# Patient Record
Sex: MALE | Race: BLACK OR AFRICAN AMERICAN | NOT HISPANIC OR LATINO | URBAN - METROPOLITAN AREA
[De-identification: names, ages, dates, MRNs, and addresses within clinical notes are randomized per-mention and may not be internally consistent; named-entity substitution may affect disease eponyms.]

---

## 2018-11-30 ENCOUNTER — EMERGENCY (EMERGENCY)
Facility: HOSPITAL | Age: 23
LOS: 1 days | Discharge: HOME | End: 2018-11-30
Attending: EMERGENCY MEDICINE
Payer: COMMERCIAL

## 2018-11-30 VITALS
WEIGHT: 192.02 LBS | RESPIRATION RATE: 18 BRPM | DIASTOLIC BLOOD PRESSURE: 73 MMHG | TEMPERATURE: 98 F | HEIGHT: 75 IN | OXYGEN SATURATION: 100 % | SYSTOLIC BLOOD PRESSURE: 135 MMHG | HEART RATE: 65 BPM

## 2018-11-30 VITALS
HEART RATE: 52 BPM | TEMPERATURE: 98 F | RESPIRATION RATE: 18 BRPM | SYSTOLIC BLOOD PRESSURE: 131 MMHG | DIASTOLIC BLOOD PRESSURE: 59 MMHG | OXYGEN SATURATION: 100 %

## 2018-11-30 PROCEDURE — 99284 EMERGENCY DEPT VISIT MOD MDM: CPT | Mod: 25

## 2018-11-30 PROCEDURE — 99284 EMERGENCY DEPT VISIT MOD MDM: CPT

## 2018-11-30 NOTE — ED PROVIDER NOTE - PROGRESS NOTE DETAILS
Lucrecia Jordan M.D. Resident  Extensive education to patient and parents about the workup done for him at [facility], and the role of ED ruling out life-threatening diagnoses. Explained the importance of neurology follow up. Shared decision making with patient and parents regarding pursuing CBC, BMP and troponin today. Patient and parents declined repeat blood work today and was satisfied with explanations about his workup from [facility] and the reasoning for not obtaining workup today.   Pt states symptoms much improved and anxiety about possibility to heart attack and stoke much lessened. Agreed to follow up with neurology appointment set for next week. Patient vitals stable, tolerating PO, ambulating.

## 2018-11-30 NOTE — ED PROVIDER NOTE - ATTENDING CONTRIBUTION TO CARE
------------ATTENDING NOTE------------   pt w/ family c/o 6 days of constant vague mild sharp pain in L chest, started after panic attack w/ associated hyperventilation and tingling/spasms in hands/feet/mouth, since has had intermittent gradual onset mild dull frontal/sides tension type headaches, in ED w/ normal neuro exam, clear chest, equal distal pulses, soft benign abd, tolerating PO, NSR and VS on cardiac monitor ED course, has had "normal" varying cardiopulmonary workups for same, in depth dw all about ddx, tx, richard, continued close outpt fu.  - Mack Calderon MD   ---------------------------------------------

## 2018-11-30 NOTE — ED ADULT NURSE NOTE - OBJECTIVE STATEMENT
23y male arrived to ED complaining of left sided chest pain and headache. 23y male arrived to ED complaining of left sided chest pain and headache. Patients sx have been ongoing x2 weeks. Patient went to ED for similar sx and followed up with PMD. Has neuro appt on Wednesday for MRI. Patient took ibuprofen with minimal relief. Patient endorses SOB. Patient A&Ox4, VS stable, ambulatory, parents at the bedside. Patient denies n/v/d, ab pain, chills, fever, blurred vision.

## 2018-11-30 NOTE — ED ADULT TRIAGE NOTE - CHIEF COMPLAINT QUOTE
"I came to the emergency room twice this week with chest pain, today I am back with chest pain and headache"

## 2018-11-30 NOTE — ED PROVIDER NOTE - OBJECTIVE STATEMENT
22 yo male with no sig medical history presents with intermittent headache, left retrosternal chest pain and paresthesias x 2 weeks. Chest pain and headache are correlated, episodes last for 30 minutes, retrosternal chest pain associated with shortness of breath and diaphoresis.   Elevated D dimer 385, negative CT Angio chest, <6 troponin on Monday at [facility], and benign repeat CBC and CMP on Wednesday. Pt followed up with PMD yesterday and prescribed Ibuprofen, Acetolopram  Has neurologist appointment on Wednesday, MRI pending insurance approval.   PMD Uriel Chaudhary. 22 yo male with no sig medical history presents with intermittent headache, left retrosternal chest pain and paresthesias x 2 weeks. retrosternal CP + HA correlated, associated with SOB, episodes last for 30 minutes. Intermittent paresthesia in face, fingers and toes, self resolving. Worked up on Monday and Wednesday of this at [facility] (elevated D dimer 385, negative CT Angio chest, <6 troponin on Monday, repeat CBC and CMP benign on Wednesday and was discharged with panic attacks. Pt followed up with PMD yesterday and prescribed Ibuprofen, anxiolytic and antidepressant and referred to neurologist appointment on Wednesday, MRI pending insurance approval. Pt in ED today because he is worried he is having heart attack or stroke. 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   PMD Uriel Chaudhary.

## 2018-11-30 NOTE — ED PROVIDER NOTE - NS ED ROS FT
GENERAL: No fever, chills  EYES: no vision changes, no discharge.   HEENT: no difficulty  swallowing or speaking   CARDIAC: + chest pain, SOB, no lower ex edema  PULMONARY: no cough, + SOB  GI: no abdominal pain, n/v/d/c  : no dysuria, frequency, hematuria  SKIN: no rashes, lesions, vesicles  NEURO: + paresthesia, no headache, lightheadedness   MSK: No joint pain, myalgia, weakness.

## 2018-11-30 NOTE — ED PROVIDER NOTE - PHYSICAL EXAMINATION
General: Patient awake alert NAD.   HEENT: normocephalic, atraumatic, EMOI, PERRL, neck supple, no JVD  Cardiac: + ashley cardia, sinus rhythm, S1, S2, no murmur, rubs, gallop, equal bilateral radial pulses and DP pulses.   LUNGS: CTA B/L no wheeze, rhonchi, rales.   Abdomen: soft NT, ND, BS all 4Q, no rebound no guarding, no CVA tenderness.   EXT: strength and ROM at patient's baseline, no edema, no calf tenderness,   Neuro: A&Ox3 gait normal, no focal neurological deficits, CN 2-12 intact, no dysmetria, no pronator drift, no rhomberg.   Skin: warm, dry, no rash, no lesions